# Patient Record
Sex: FEMALE | Race: BLACK OR AFRICAN AMERICAN | NOT HISPANIC OR LATINO | URBAN - METROPOLITAN AREA
[De-identification: names, ages, dates, MRNs, and addresses within clinical notes are randomized per-mention and may not be internally consistent; named-entity substitution may affect disease eponyms.]

---

## 2022-02-15 ENCOUNTER — PREPPED CHART (OUTPATIENT)
Dept: URBAN - METROPOLITAN AREA CLINIC 64 | Facility: CLINIC | Age: 52
End: 2022-02-15

## 2022-02-15 PROBLEM — H33.313 RETINAL TEAR: Status: WELL-CONTROLLED | Noted: 2022-02-15

## 2022-02-15 PROBLEM — H35.372 EPIRETINAL MEMBRANE: Noted: 2022-02-15

## 2022-02-15 PROBLEM — H35.411 LATTICE DEGENERATION OF RETINA: Noted: 2022-02-15

## 2022-02-15 PROBLEM — H33.321 ATROPHIC RETINAL HOLE: Status: WELL-CONTROLLED | Noted: 2022-02-15

## 2022-02-15 PROBLEM — H25.13 NS CATARACT: Noted: 2022-02-15

## 2022-02-15 PROBLEM — H35.411 LATTICE DEGENERATION OF RETINA: Status: WELL-CONTROLLED | Noted: 2022-02-15

## 2022-02-15 PROBLEM — H33.321 ATROPHIC RETINAL HOLE: Noted: 2022-02-15

## 2022-07-25 ASSESSMENT — VISUAL ACUITY
OS_CC: 20/25
OD_PH: 20/25-3
OD_CC: 20/40

## 2022-07-25 ASSESSMENT — TONOMETRY
OS_IOP_MMHG: 14
OD_IOP_MMHG: 14

## 2022-10-12 ENCOUNTER — APPOINTMENT (RX ONLY)
Dept: URBAN - METROPOLITAN AREA CLINIC 338 | Facility: CLINIC | Age: 52
Setting detail: DERMATOLOGY
End: 2022-10-12

## 2022-10-12 DIAGNOSIS — L65.9 NONSCARRING HAIR LOSS, UNSPECIFIED: ICD-10-CM | Status: INADEQUATELY CONTROLLED

## 2022-10-12 PROCEDURE — ? PRESCRIPTION

## 2022-10-12 PROCEDURE — ? COUNSELING

## 2022-10-12 PROCEDURE — ? PRESCRIPTION MEDICATION MANAGEMENT

## 2022-10-12 PROCEDURE — 99204 OFFICE O/P NEW MOD 45 MIN: CPT

## 2022-10-12 RX ORDER — MINOXIDIL
POWDER (GRAM) MISCELLANEOUS
Qty: 30 | Refills: 2 | Status: ERX | COMMUNITY
Start: 2022-10-12

## 2022-10-12 RX ORDER — CLOBETASOL PROPIONATE 0.5 MG/ML
SOLUTION TOPICAL
Qty: 50 | Refills: 2 | Status: ERX | COMMUNITY
Start: 2022-10-12

## 2022-10-12 RX ADMIN — Medication: at 00:00

## 2022-10-12 RX ADMIN — CLOBETASOL PROPIONATE: 0.5 SOLUTION TOPICAL at 00:00

## 2022-10-12 ASSESSMENT — LOCATION DETAILED DESCRIPTION DERM
LOCATION DETAILED: POSTERIOR MID-PARIETAL SCALP
LOCATION DETAILED: RIGHT MEDIAL FRONTAL SCALP

## 2022-10-12 ASSESSMENT — LOCATION SIMPLE DESCRIPTION DERM
LOCATION SIMPLE: POSTERIOR SCALP
LOCATION SIMPLE: RIGHT SCALP

## 2022-10-12 ASSESSMENT — LOCATION ZONE DERM: LOCATION ZONE: SCALP

## 2022-10-12 NOTE — HPI: SKIN LESIONS
How Severe Is Your Skin Lesion?: mild
Received Rx for shoes from [facility].   
Have Your Skin Lesions Been Treated?: not been treated
Is This A New Presentation, Or A Follow-Up?: Skin Lesions

## 2022-10-12 NOTE — PROCEDURE: PRESCRIPTION MEDICATION MANAGEMENT
Plan: [facility] would like to do a biopsy to differentiating whether it is scarring or nonscarring alopecia. Pt defers biopsy and would like to do topical tx first.
Initiate Treatment: Minoxidil compound nightly\\n\\nClobetasol foam QAM for scalp
Detail Level: Zone
Render In Strict Bullet Format?: No

## 2022-12-28 RX ORDER — MINOXIDIL
POWDER (GRAM) MISCELLANEOUS
Qty: 30 | Refills: 2 | Status: ERX

## 2023-01-17 ENCOUNTER — APPOINTMENT (RX ONLY)
Dept: URBAN - METROPOLITAN AREA CLINIC 337 | Facility: CLINIC | Age: 53
Setting detail: DERMATOLOGY
End: 2023-01-17

## 2023-01-17 ENCOUNTER — FOLLOW UP (OUTPATIENT)
Dept: URBAN - METROPOLITAN AREA CLINIC 64 | Facility: CLINIC | Age: 53
End: 2023-01-17

## 2023-01-17 DIAGNOSIS — H35.411: ICD-10-CM

## 2023-01-17 DIAGNOSIS — H33.313: ICD-10-CM

## 2023-01-17 DIAGNOSIS — H33.321: ICD-10-CM

## 2023-01-17 DIAGNOSIS — H25.13: ICD-10-CM

## 2023-01-17 DIAGNOSIS — H33.002: ICD-10-CM

## 2023-01-17 DIAGNOSIS — H35.372: ICD-10-CM

## 2023-01-17 DIAGNOSIS — L65.9 NONSCARRING HAIR LOSS, UNSPECIFIED: ICD-10-CM

## 2023-01-17 PROCEDURE — 99214 OFFICE O/P EST MOD 30 MIN: CPT

## 2023-01-17 PROCEDURE — 92134 CPTRZ OPH DX IMG PST SGM RTA: CPT

## 2023-01-17 PROCEDURE — ? COUNSELING

## 2023-01-17 PROCEDURE — 92014 COMPRE OPH EXAM EST PT 1/>: CPT | Mod: 25

## 2023-01-17 PROCEDURE — 67145 PROPH RTA DTCHMNT PC: CPT

## 2023-01-17 PROCEDURE — ? PRESCRIPTION MEDICATION MANAGEMENT

## 2023-01-17 PROCEDURE — ? PRESCRIPTION

## 2023-01-17 PROCEDURE — 92202 OPSCPY EXTND ON/MAC DRAW: CPT | Mod: 59

## 2023-01-17 RX ORDER — CLOBETASOL PROPIONATE 0.5 MG/ML
SOLUTION TOPICAL
Qty: 50 | Refills: 2 | Status: ERX

## 2023-01-17 RX ORDER — MINOXIDIL
POWDER (GRAM) MISCELLANEOUS
Qty: 30 | Refills: 11 | Status: ERX

## 2023-01-17 RX ORDER — MINOXIDIL
POWDER (GRAM) MISCELLANEOUS
Qty: 30 | Refills: 2 | Status: ERX

## 2023-01-17 ASSESSMENT — LOCATION DETAILED DESCRIPTION DERM
LOCATION DETAILED: RIGHT MEDIAL FRONTAL SCALP
LOCATION DETAILED: POSTERIOR MID-PARIETAL SCALP

## 2023-01-17 ASSESSMENT — VISUAL ACUITY
OD_SC: 20/30-1
OS_CC: 20/25-1

## 2023-01-17 ASSESSMENT — TONOMETRY
OD_IOP_MMHG: 16
OS_IOP_MMHG: 14

## 2023-01-17 ASSESSMENT — LOCATION SIMPLE DESCRIPTION DERM
LOCATION SIMPLE: RIGHT SCALP
LOCATION SIMPLE: POSTERIOR SCALP

## 2023-01-17 ASSESSMENT — LOCATION ZONE DERM: LOCATION ZONE: SCALP

## 2023-06-21 RX ORDER — CLOBETASOL PROPIONATE 0.5 MG/ML
SOLUTION TOPICAL
Qty: 50 | Refills: 0 | Status: ERX

## 2023-08-01 ENCOUNTER — FOLLOW UP (OUTPATIENT)
Dept: URBAN - METROPOLITAN AREA CLINIC 64 | Facility: CLINIC | Age: 53
End: 2023-08-01

## 2023-08-01 DIAGNOSIS — H35.411: ICD-10-CM

## 2023-08-01 DIAGNOSIS — H33.313: ICD-10-CM

## 2023-08-01 DIAGNOSIS — H35.372: ICD-10-CM

## 2023-08-01 DIAGNOSIS — H33.321: ICD-10-CM

## 2023-08-01 PROCEDURE — 92134 CPTRZ OPH DX IMG PST SGM RTA: CPT

## 2023-08-01 PROCEDURE — 92014 COMPRE OPH EXAM EST PT 1/>: CPT

## 2023-08-01 PROCEDURE — 92201 OPSCPY EXTND RTA DRAW UNI/BI: CPT

## 2023-08-01 ASSESSMENT — VISUAL ACUITY
OS_CC: 20/20-1
OD_SC: 20/20-1

## 2023-08-01 ASSESSMENT — TONOMETRY
OD_IOP_MMHG: 15
OS_IOP_MMHG: 13

## 2023-08-02 ENCOUNTER — APPOINTMENT (RX ONLY)
Dept: URBAN - METROPOLITAN AREA CLINIC 338 | Facility: CLINIC | Age: 53
Setting detail: DERMATOLOGY
End: 2023-08-02

## 2023-08-02 DIAGNOSIS — L65.9 NONSCARRING HAIR LOSS, UNSPECIFIED: ICD-10-CM

## 2023-08-02 PROCEDURE — ? PRESCRIPTION MEDICATION MANAGEMENT

## 2023-08-02 PROCEDURE — 99214 OFFICE O/P EST MOD 30 MIN: CPT

## 2023-08-02 PROCEDURE — ? COUNSELING

## 2023-08-02 PROCEDURE — ? PRESCRIPTION

## 2023-08-02 RX ORDER — MINOXIDIL
POWDER (GRAM) MISCELLANEOUS
Qty: 30 | Refills: 6 | Status: ERX

## 2023-08-02 ASSESSMENT — LOCATION SIMPLE DESCRIPTION DERM
LOCATION SIMPLE: LEFT FOREHEAD
LOCATION SIMPLE: RIGHT SCALP
LOCATION SIMPLE: FRONTAL SCALP

## 2023-08-02 ASSESSMENT — LOCATION ZONE DERM
LOCATION ZONE: SCALP
LOCATION ZONE: FACE

## 2023-08-02 ASSESSMENT — LOCATION DETAILED DESCRIPTION DERM
LOCATION DETAILED: RIGHT MEDIAL FRONTAL SCALP
LOCATION DETAILED: LEFT SUPERIOR MEDIAL FOREHEAD
LOCATION DETAILED: MEDIAL FRONTAL SCALP

## 2023-08-02 NOTE — PROCEDURE: PRESCRIPTION MEDICATION MANAGEMENT
Render In Strict Bullet Format?: No
Plan: Recommend Nutrafol daily.\\nPt top get lab work done with PCP for hair loss\\nAvoid hairstyles with excessive traction to prevent excessive pulling
Modify Regimen: Will compound Minoxidil 10% with /Latanoprost 0.01%/Finasteride 0.1% Solution
Detail Level: Zone

## 2024-03-13 ENCOUNTER — FOLLOW UP (OUTPATIENT)
Dept: URBAN - METROPOLITAN AREA CLINIC 80 | Facility: CLINIC | Age: 54
End: 2024-03-13

## 2024-03-13 DIAGNOSIS — H33.002: ICD-10-CM

## 2024-03-13 DIAGNOSIS — H33.321: ICD-10-CM

## 2024-03-13 DIAGNOSIS — H25.13: ICD-10-CM

## 2024-03-13 DIAGNOSIS — H35.411: ICD-10-CM

## 2024-03-13 DIAGNOSIS — H33.313: ICD-10-CM

## 2024-03-13 DIAGNOSIS — H35.372: ICD-10-CM

## 2024-03-13 PROCEDURE — 92134 CPTRZ OPH DX IMG PST SGM RTA: CPT

## 2024-03-13 PROCEDURE — 92014 COMPRE OPH EXAM EST PT 1/>: CPT | Mod: 25

## 2024-03-13 PROCEDURE — 67145 PROPH RTA DTCHMNT PC: CPT

## 2024-03-13 PROCEDURE — 92202 OPSCPY EXTND ON/MAC DRAW: CPT | Mod: 59

## 2024-03-13 ASSESSMENT — TONOMETRY
OD_IOP_MMHG: 20
OS_IOP_MMHG: 17

## 2024-03-13 ASSESSMENT — VISUAL ACUITY
OD_PH: 20/20-2
OS_CC: 20/25
OD_SC: 20/30-3

## 2024-11-18 ENCOUNTER — FOLLOW UP (OUTPATIENT)
Dept: URBAN - METROPOLITAN AREA CLINIC 67 | Facility: CLINIC | Age: 54
End: 2024-11-18

## 2024-11-18 DIAGNOSIS — H33.321: ICD-10-CM

## 2024-11-18 DIAGNOSIS — H25.13: ICD-10-CM

## 2024-11-18 DIAGNOSIS — H33.002: ICD-10-CM

## 2024-11-18 DIAGNOSIS — H35.372: ICD-10-CM

## 2024-11-18 DIAGNOSIS — H35.411: ICD-10-CM

## 2024-11-18 DIAGNOSIS — H33.313: ICD-10-CM

## 2024-11-18 PROCEDURE — 92202 OPSCPY EXTND ON/MAC DRAW: CPT

## 2024-11-18 PROCEDURE — 92014 COMPRE OPH EXAM EST PT 1/>: CPT

## 2024-11-18 PROCEDURE — 92134 CPTRZ OPH DX IMG PST SGM RTA: CPT

## 2024-11-18 ASSESSMENT — VISUAL ACUITY
OS_CC: 20/25+1
OD_SC: 20/25-1

## 2024-11-18 ASSESSMENT — TONOMETRY
OD_IOP_MMHG: 13
OS_IOP_MMHG: 12

## 2025-07-03 ENCOUNTER — FOLLOW UP (OUTPATIENT)
Dept: URBAN - METROPOLITAN AREA CLINIC 67 | Facility: CLINIC | Age: 55
End: 2025-07-03

## 2025-07-03 ASSESSMENT — TONOMETRY
OS_IOP_MMHG: 16
OD_IOP_MMHG: 18

## 2025-07-03 ASSESSMENT — VISUAL ACUITY
OS_PH: 20/20-2
OD_SC: 20/25
OD_PH: 20/20